# Patient Record
Sex: MALE | Race: WHITE | URBAN - METROPOLITAN AREA
[De-identification: names, ages, dates, MRNs, and addresses within clinical notes are randomized per-mention and may not be internally consistent; named-entity substitution may affect disease eponyms.]

---

## 2019-01-01 ENCOUNTER — HOSPITAL ENCOUNTER (EMERGENCY)
Facility: HOSPITAL | Age: 74
End: 2019-12-02
Admitting: SURGERY
Payer: COMMERCIAL

## 2019-01-01 VITALS — WEIGHT: 240 LBS | TEMPERATURE: 95.6 F | BODY MASS INDEX: 35.55 KG/M2 | HEIGHT: 69 IN

## 2019-01-01 PROCEDURE — 99282 EMERGENCY DEPT VISIT SF MDM: CPT | Performed by: SURGERY

## 2019-01-01 PROCEDURE — 99285 EMERGENCY DEPT VISIT HI MDM: CPT

## 2019-01-01 PROCEDURE — NC001 PR NO CHARGE: Performed by: SURGERY

## 2019-01-01 PROCEDURE — NC001 PR NO CHARGE: Performed by: EMERGENCY MEDICINE

## 2019-01-01 PROCEDURE — 96374 THER/PROPH/DIAG INJ IV PUSH: CPT

## 2019-01-01 RX ORDER — EPINEPHRINE 0.1 MG/ML
2 SYRINGE (ML) INJECTION ONCE
Status: COMPLETED | OUTPATIENT
Start: 2019-01-01 | End: 2019-01-01

## 2019-01-01 RX ORDER — LOSARTAN POTASSIUM AND HYDROCHLOROTHIAZIDE 12.5; 1 MG/1; MG/1
1 TABLET ORAL DAILY
COMMUNITY

## 2019-01-01 RX ORDER — ASPIRIN 81 MG/1
81 TABLET, CHEWABLE ORAL DAILY
COMMUNITY

## 2019-01-01 RX ORDER — METOPROLOL SUCCINATE 50 MG/1
50 TABLET, EXTENDED RELEASE ORAL DAILY
COMMUNITY

## 2019-01-01 RX ORDER — OMEPRAZOLE 20 MG/1
20 CAPSULE, DELAYED RELEASE ORAL DAILY
COMMUNITY

## 2019-01-01 RX ADMIN — EPINEPHRINE 1 MG: 0.1 INJECTION INTRACARDIAC; INTRAVENOUS at 10:16

## 2019-12-02 NOTE — ED NOTES
Wife given patient's wallet with credit cards and cash and given patient's silver watch  Also given cut up clothing and boots  No rings noted on patient's hands, wife made aware        Larry Medrano RN  12/02/19 3620

## 2019-12-02 NOTE — DEATH NOTE
INPATIENT DEATH NOTE  Manuel Coker 69 y o  male MRN: [id number]  Unit/Bed#: TR 02 Encounter: [id number]    Date, Time and Cause of Death    Date of Death:  12/2/19  Time of Death:  10:15 AM  Preliminary Cause of Death:  Cardiac arrest  Entered by:  Sue Mccarty md[SW1 1]     Attribution     QG3 6 Marlin Herrera MD 12/02/19 10:35             PHYSICAL EXAM:  Unresponsive to noxious stimuli, Spontaneous respirations absent, Breath sounds absent, Carotid pulse absent, Heart sounds absent, Pupillary light reflex absent and Corneal blink reflex absent    Medical Examiner notification criteria:  Any type of trauma   Medical Examiner's office notified?:  Yes   Medical Examiner accepted case?:  No  Name of Medical Examiner: NA    Family Notification  Was the family notified?: Yes  Date Notified: 12/02/19  Time Notified: 2964  Notified by: Allen Jones  Name of Family Notified of Death: Wife   Relationship to Patient: Spouse  Family Notification Route: At bedside    Autopsy Options:  Decision for post-mortem examination not yet made by next of kin      Primary Service Attending Physician notified?:  yes - Attending:  Allen Rosen responsible for completing Discharge Summary:  Marlin Herrera
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
## 2019-12-03 NOTE — SOCIAL WORK
CM attempted to call the pt's wife  Kristin Riveraer (Spouse)  [phone]  In order to discuss  and burial plans but the above number is not in service